# Patient Record
Sex: FEMALE | Race: WHITE | URBAN - METROPOLITAN AREA
[De-identification: names, ages, dates, MRNs, and addresses within clinical notes are randomized per-mention and may not be internally consistent; named-entity substitution may affect disease eponyms.]

---

## 2018-10-26 ENCOUNTER — EMERGENCY (EMERGENCY)
Facility: HOSPITAL | Age: 18
LOS: 1 days | Discharge: HOME | End: 2018-10-26
Attending: EMERGENCY MEDICINE | Admitting: EMERGENCY MEDICINE
Payer: COMMERCIAL

## 2018-10-26 VITALS
RESPIRATION RATE: 18 BRPM | SYSTOLIC BLOOD PRESSURE: 111 MMHG | TEMPERATURE: 99 F | DIASTOLIC BLOOD PRESSURE: 71 MMHG | OXYGEN SATURATION: 97 % | HEART RATE: 76 BPM

## 2018-10-26 VITALS
OXYGEN SATURATION: 98 % | DIASTOLIC BLOOD PRESSURE: 73 MMHG | SYSTOLIC BLOOD PRESSURE: 109 MMHG | RESPIRATION RATE: 18 BRPM | HEART RATE: 78 BPM

## 2018-10-26 DIAGNOSIS — R42 DIZZINESS AND GIDDINESS: ICD-10-CM

## 2018-10-26 DIAGNOSIS — R11.0 NAUSEA: ICD-10-CM

## 2018-10-26 DIAGNOSIS — Z79.899 OTHER LONG TERM (CURRENT) DRUG THERAPY: ICD-10-CM

## 2018-10-26 DIAGNOSIS — R10.9 UNSPECIFIED ABDOMINAL PAIN: ICD-10-CM

## 2018-10-26 PROCEDURE — 99284 EMERGENCY DEPT VISIT MOD MDM: CPT

## 2018-10-26 RX ORDER — ONDANSETRON 8 MG/1
1 TABLET, FILM COATED ORAL
Qty: 12 | Refills: 0 | OUTPATIENT
Start: 2018-10-26 | End: 2018-10-29

## 2018-10-26 RX ORDER — ONDANSETRON 8 MG/1
4 TABLET, FILM COATED ORAL ONCE
Qty: 0 | Refills: 0 | Status: COMPLETED | OUTPATIENT
Start: 2018-10-26 | End: 2018-10-26

## 2018-10-26 RX ADMIN — ONDANSETRON 4 MILLIGRAM(S): 8 TABLET, FILM COATED ORAL at 17:51

## 2018-10-26 NOTE — ED PROVIDER NOTE - MEDICAL DECISION MAKING DETAILS
Patient nausea s/p taking plan B. Responded well to zofran in the ED. Tolerating po. Discharge to home. Zofran ODT

## 2018-10-26 NOTE — ED PROVIDER NOTE - PROGRESS NOTE DETAILS
Patient tolerating po ate sandwich and snacks in the ED. Zofran helped with symptoms per patient. Will discharge to home with Prn Zofran.

## 2018-10-26 NOTE — ED PROVIDER NOTE - OBJECTIVE STATEMENT
16 y/o female with PMHx of epilepsy presents to the ED with complaints of nausea. Pt took Plan B yesterday after unintentional breaking of contraceptive. She has associated Sx of abd pain, dizziness, and shaking of fingers that began today while Pt was in class. No fever, no chills. Pt denies taking Plan B in the past. 16 y/o female with PMHx of epilepsy presents to the ED with complaints of nausea. Pt took Plan B yesterday after unintentional breaking of contraceptive during sexual intercourse. She has associated Sx of abd pain, dizziness, and shaking of fingers that began today while Pt was in class. No fever, no chills. Pt denies taking Plan B in the past. Reports she has barely eaten today. Feels hungry.